# Patient Record
Sex: MALE | Race: WHITE | NOT HISPANIC OR LATINO | URBAN - METROPOLITAN AREA
[De-identification: names, ages, dates, MRNs, and addresses within clinical notes are randomized per-mention and may not be internally consistent; named-entity substitution may affect disease eponyms.]

---

## 2023-06-20 LAB — PROSTATE SPECIFIC AG (NG/ML) IN SER/PLAS: 4.39 NG/ML (ref 0–4)

## 2024-07-09 ENCOUNTER — LAB (OUTPATIENT)
Dept: LAB | Facility: LAB | Age: 64
End: 2024-07-09
Payer: MEDICARE

## 2024-07-09 DIAGNOSIS — R97.20 ELEVATED PROSTATE SPECIFIC ANTIGEN (PSA): Primary | ICD-10-CM

## 2024-07-09 PROCEDURE — 84154 ASSAY OF PSA FREE: CPT

## 2024-07-09 PROCEDURE — 84153 ASSAY OF PSA TOTAL: CPT

## 2024-07-09 PROCEDURE — 36415 COLL VENOUS BLD VENIPUNCTURE: CPT

## 2024-07-11 LAB
PSA FREE MFR SERPL: 18 %
PSA FREE SERPL-MCNC: 1 NG/ML
PSA SERPL IA-MCNC: 5.7 NG/ML (ref 0–4)

## 2024-07-16 ENCOUNTER — OFFICE VISIT (OUTPATIENT)
Dept: UROLOGY | Facility: HOSPITAL | Age: 64
End: 2024-07-16
Payer: MEDICARE

## 2024-07-16 VITALS
RESPIRATION RATE: 18 BRPM | DIASTOLIC BLOOD PRESSURE: 93 MMHG | WEIGHT: 238.1 LBS | BODY MASS INDEX: 30.4 KG/M2 | HEART RATE: 69 BPM | OXYGEN SATURATION: 99 % | SYSTOLIC BLOOD PRESSURE: 128 MMHG | TEMPERATURE: 97.7 F

## 2024-07-16 DIAGNOSIS — R97.20 ELEVATED PSA: Primary | ICD-10-CM

## 2024-07-16 PROCEDURE — 99214 OFFICE O/P EST MOD 30 MIN: CPT | Performed by: STUDENT IN AN ORGANIZED HEALTH CARE EDUCATION/TRAINING PROGRAM

## 2024-07-16 PROCEDURE — G2211 COMPLEX E/M VISIT ADD ON: HCPCS | Performed by: STUDENT IN AN ORGANIZED HEALTH CARE EDUCATION/TRAINING PROGRAM

## 2024-07-16 PROCEDURE — 1125F AMNT PAIN NOTED PAIN PRSNT: CPT | Performed by: STUDENT IN AN ORGANIZED HEALTH CARE EDUCATION/TRAINING PROGRAM

## 2024-07-16 PROCEDURE — 1036F TOBACCO NON-USER: CPT | Performed by: STUDENT IN AN ORGANIZED HEALTH CARE EDUCATION/TRAINING PROGRAM

## 2024-07-16 ASSESSMENT — PAIN SCALES - GENERAL: PAINLEVEL: 3

## 2024-07-16 ASSESSMENT — ENCOUNTER SYMPTOMS
LOSS OF SENSATION IN FEET: 0
OCCASIONAL FEELINGS OF UNSTEADINESS: 0
DEPRESSION: 0

## 2024-07-16 NOTE — PROGRESS NOTES
Subjective   Patient ID: Zach Brown is a 63 y.o. male who presents for his 1 year PSA follow up visit.    HPI  The patient is a 63 year old male with elevated PSA here for 1 year FUV. His PSA is 5.7, previously 4.39. PSA free was mildly elevated 18%.    States he is physically active. Not taking any medication.        Prostate MRI 7/6/2023 showed a 72 g prostate and a PIRAD 2 lesion. There were no PIRAD 3 or greater lesions. His PSA density is 0.06.      PSA trends:  July 2024- 5.7  July 2023- 4.39  June 2023- 10.2     No erection or urinary problems. No family history of prostate cancer     Past medical, surgical, family and social history were reviewed and unchanged since last visit besides what is in the HPI.           Review of Systems    All systems were reviewed. Anything negative was noted in the HPI.    Objective   Physical Exam  Gen: No acute distress     Psych: Alert and oriented x3     Neuro:  Normal ROM    Resp: Nonlabored respirations     CV: Regular rate and rhythm     Abd: S, NT, ND.     : Deferred    Skin: Warm, dry and intact without rashes     Lymphatics: No peripheral edema        Assessment/Plan   Elevated PSA      Plan:  This is a 63 old man referred for an elevated PSA. He has had a prostate MRI. We spoke at length regarding what PSA is, how it is used as a screening measure for prostate cancer. We went into detail that PSA is only a screening test as there are other causes of elevated values such as BPH. In order to investigate the cause of the elevation in PSA, a prostate biopsy needs to be completed. This is done under transrectal ultrasound guidance. It may be done in conjunction with MRI images in attempts to make the biopsy more accurate by targeting suspicious lesions on MRI. I spoke about the risks and benefits of TRUS bx. We will get him set up in the near future. 34% risk of having a prostate cancer based on biopsy.    We discussed preventative care to manage elevated PSA.      Keep up with regular preventative care with PCP.    Follow up:   1 year with PSA and free PSA labs.        Scribe Attestation  By signing my name below, I, Bonny Rubi   attest that this documentation has been prepared under the direction and in the presence of Jadiel Gipson MD MPH

## 2025-07-08 LAB
PSA FREE MFR SERPL: 14 % (CALC)
PSA FREE SERPL-MCNC: 0.8 NG/ML
PSA SERPL-MCNC: 5.7 NG/ML

## 2025-07-15 ENCOUNTER — APPOINTMENT (OUTPATIENT)
Dept: UROLOGY | Facility: HOSPITAL | Age: 65
End: 2025-07-15
Payer: MEDICARE

## 2025-07-25 ENCOUNTER — APPOINTMENT (OUTPATIENT)
Age: 65
End: 2025-07-25
Payer: MEDICARE

## 2025-07-25 VITALS — DIASTOLIC BLOOD PRESSURE: 95 MMHG | SYSTOLIC BLOOD PRESSURE: 149 MMHG | HEART RATE: 75 BPM

## 2025-07-25 DIAGNOSIS — R97.20 ELEVATED PSA: ICD-10-CM

## 2025-07-25 PROCEDURE — G2211 COMPLEX E/M VISIT ADD ON: HCPCS | Performed by: STUDENT IN AN ORGANIZED HEALTH CARE EDUCATION/TRAINING PROGRAM

## 2025-07-25 PROCEDURE — 1159F MED LIST DOCD IN RCRD: CPT | Performed by: STUDENT IN AN ORGANIZED HEALTH CARE EDUCATION/TRAINING PROGRAM

## 2025-07-25 PROCEDURE — 99213 OFFICE O/P EST LOW 20 MIN: CPT | Performed by: STUDENT IN AN ORGANIZED HEALTH CARE EDUCATION/TRAINING PROGRAM

## 2025-07-25 RX ORDER — ATORVASTATIN CALCIUM 40 MG/1
TABLET, FILM COATED ORAL
COMMUNITY
Start: 2025-07-24

## 2025-07-25 RX ORDER — ASPIRIN 81 MG/1
TABLET ORAL
COMMUNITY

## 2025-07-25 RX ORDER — ALPRAZOLAM 1 MG/1
1 TABLET ORAL
COMMUNITY
Start: 2025-05-06

## 2025-07-25 RX ORDER — METOPROLOL SUCCINATE 100 MG/1
1 TABLET, EXTENDED RELEASE ORAL
COMMUNITY
Start: 2025-04-16

## 2025-07-25 NOTE — PROGRESS NOTES
Subjective    Zach Brown is a 64 y.o. male with elevated PSA here for 1 year FUV. His PSA is 5.7, fPSA is 14%    He reports feeling well.  He is retired and plans to travel across the world. He denies bothersome urinary symptoms.      Prostate MRI 7/6/2023 showed a 72 g prostate and a PIRAD 2 lesion. There were no PIRAD 3 or greater lesions. His PSA density is 0.06.      PSA trends:  July 2025- 5.7 fPSA is 14%  July 2024- 5.7  July 2023- 4.39  June 2023- 10.2      Review of Systems    All systems were reviewed. Anything negative was noted in the HPI.    Objective   Physical Exam  Gen: No acute distress      Psych: Alert and oriented x3      Neuro:  Normal ROM     Resp: Nonlabored respirations      CV: Regular rate and rhythm      Abd: S, NT, ND.     : Deferred     Skin: Warm, dry and intact without rashes      Lymphatics: No peripheral edema           Assessment & Plan  Elevated PSA   64 y.o. male with elevated PSA. His PSA is 5.7, fPSA is 14%    Prostate MRI 7/6/2023 showed a 72 g prostate and a PIRAD 2 lesion. There were no PIRAD 3 or greater lesions. His PSA density is 0.06.     PLAN:  Repeat PSA with a FUV in one year either with me or with his pcp or one of our CNP Gely June.  Can be in person or virtual.    Orders:    PSA, total and free; Future                               Scribe Attestation  By signing my name below, I, Maribeth Leger, [place]   attest that this documentation has been prepared under the direction and in the presence of Jadiel Gipson MD MPH

## 2025-07-25 NOTE — ASSESSMENT & PLAN NOTE
64 y.o. male with elevated PSA. His PSA is 5.7, fPSA is 14%    Prostate MRI 7/6/2023 showed a 72 g prostate and a PIRAD 2 lesion. There were no PIRAD 3 or greater lesions. His PSA density is 0.06.     PLAN:  Repeat PSA with a FUV in one year either with me or with his pcp or one of our CNP Gely June.  Can be in person or virtual.    Orders:    PSA, total and free; Future

## 2026-07-31 ENCOUNTER — APPOINTMENT (OUTPATIENT)
Age: 66
End: 2026-07-31
Payer: MEDICARE